# Patient Record
Sex: FEMALE | Race: WHITE
[De-identification: names, ages, dates, MRNs, and addresses within clinical notes are randomized per-mention and may not be internally consistent; named-entity substitution may affect disease eponyms.]

---

## 2019-06-01 ENCOUNTER — HOSPITAL ENCOUNTER (EMERGENCY)
Dept: HOSPITAL 42 - ED | Age: 24
Discharge: HOME | End: 2019-06-01
Payer: COMMERCIAL

## 2019-06-01 VITALS — BODY MASS INDEX: 24.1 KG/M2

## 2019-06-01 VITALS — TEMPERATURE: 99.2 F | OXYGEN SATURATION: 98 % | RESPIRATION RATE: 18 BRPM

## 2019-06-01 VITALS — SYSTOLIC BLOOD PRESSURE: 130 MMHG | DIASTOLIC BLOOD PRESSURE: 79 MMHG | HEART RATE: 90 BPM

## 2019-06-01 DIAGNOSIS — K04.7: Primary | ICD-10-CM

## 2019-06-01 PROCEDURE — 99283 EMERGENCY DEPT VISIT LOW MDM: CPT

## 2019-06-01 PROCEDURE — 96372 THER/PROPH/DIAG INJ SC/IM: CPT

## 2019-06-01 PROCEDURE — 81025 URINE PREGNANCY TEST: CPT

## 2019-06-01 NOTE — ED PDOC
Arrival/HPI





- General


Chief Complaint: Dental Pain


Time Seen by Provider: 06/01/19 11:25


Historian: Patient





- History of Present Illness


Narrative History of Present Illness (Text): 





06/01/19 12:06


23-year-old female presents today with a 3-day history of left upper dental 

pain.  Patient states she has been taking Motrin at home with improvement in the

pain.  Patient is complaining of a small lump located to the left upper front of

the roof of the mouth.  Patient denies fevers.  Patient denies trismus or 

drooling.  Patient states she has some pain with eating but improves after 

taking antibiotics.  No vomiting or diarrhea.  No other complaints





Past Medical History





- Provider Review


Nursing Documentation Reviewed: Yes





- Travel History


Have you recently traveled outside [place] w/in the past 3 mons?: No





- Infectious Disease


Hx of Infectious Diseases: None





- Tetanus Immunization


Tetanus Immunization: Unknown





- Cardiac


Hx Cardiac Disorders: No





- Pulmonary


Hx Respiratory Disorders: No





- Neurological


Hx Neurological Disorder: No





- HEENT


Hx HEENT Disorder: No





- Renal


Hx Renal Disorder: No





- Endocrine/Metabolic


Hx Endocrine Disorders: No





- Hematological/Oncological


Hx Blood Disorders: No





- Integumentary


Hx Dermatological Disorder: No





- Musculoskeletal/Rheumatological


Other/Comment: Scliosis





- Gastrointestinal


Hx Gastrointestinal Disorders: No





- Genitourinary/Gynecological


Hx Genitourinary Disorders: No





- Psychiatric


Hx Depression: No


Hx Emotional Abuse: No


Hx Physical Abuse: No


Hx Substance Use: No





- Surgical History


Hx Orthopedic Surgery: Yes (BACK)


Other/Comment: Metal plate in the back





- Anesthesia


Hx Anesthesia: Yes


Hx Anesthesia Reactions: No





- Suicidal Assessment


Feels Threatened In Home Enviroment: No





Family/Social History





- Physician Review


Nursing Documentation Reviewed: Yes


Family/Social History: Unknown Family HX


Smoking Status: Never Smoked


Hx Alcohol Use: No


Hx Substance Use: No


Hx Substance Use Treatment: No





Allergies/Home Meds


Allergies/Adverse Reactions: 


Allergies





No Known Allergies Allergy (Verified 06/01/19 11:24)


   











Review of Systems





- Review of Systems


Constitutional: absent: Fatigue, Fevers


ENT: Other (dental abscess).  absent: Sore Throat, Sinus Congestion


Respiratory: absent: SOB, Cough


Cardiovascular: absent: Chest Pain, Palpitations


Gastrointestinal: absent: Abdominal Pain, Nausea, Vomiting


Skin: Abscess (dental)


Neurological: absent: Headache, Dizziness


Psychiatric: absent: Anxiety, Depression





Physical Exam


Vital Signs Reviewed: Yes





Vital Signs











  Temp Pulse Resp BP Pulse Ox


 


 06/01/19 11:21  99.2 F  98 H  18  132/92 H  98











Temperature: Afebrile


Blood Pressure: Normal


Pulse: Regular


Respiratory Rate: Normal


Appearance: Positive for: Well-Appearing, Non-Toxic, Comfortable


Pain Distress: None


Mental Status: Positive for: Alert and Oriented X 3





- Systems Exam


Head: Present: Atraumatic


Conjunctiva: Present: Normal


Ears: Present: Normal


Mouth: Present: Moist Mucous Membranes, Normal Lips, Normal Tounge, Other (there

is a tender fluctuant lump noted to the roof of the mouth just adjacent to the 

#10/11th tooth. no erythema; ).  No: Drooling, Trismus


Pharnyx: No: ERYTHEMA, EXUDATE, TONSILS ENLARGED


Nose (External): Present: Atraumatic


Nose (Internal): Present: Normal Inspection


Neck: Present: Normal Range of Motion.  No: Lymphadenopathy, Trachea Midline


Respiratory/Chest: Present: Clear to Auscultation, Good Air Exchange.  No: 

Respiratory Distress, Accessory Muscle Use


Cardiovascular: Present: Regular Rate and Rhythm, Normal S1, S2.  No: Murmurs


Neurological: Present: GCS=15, Speech Normal


Skin: Present: Warm, Dry, Normal Color.  No: Rashes


Psychiatric: Present: Alert, Oriented x 3





Medical Decision Making


ED Course and Treatment: 





06/01/19 12:11


Patient is nontoxic well-appearing in no distress with stable vital signs





No trismus or drooling, moist mucous membranes





Toradol


clindamycin 300mg





Patient reassessment: Patient is feeling better after medications.





I advised follow-up with the dentist within the next 2 days. I advised immediate

return is symptoms worsen persist or if new concerning symptoms develop





Patient verbalizes understanding of discharge instructions and need for 

immediate followup.








Impression: Toothache, dental abscess


Motrin every 6 hours as needed for pain


Increase fluids


Clindamycin 1 tablet 3 times daily 7 days


Follow-up with the dentist within the next 2 days


Follow up with the primary care physician within the next 2 days.


Return immediately if symptoms worsen persist or if new symptoms develop: high 

fevers, worsening pain, swelling, erythema or if any other concerning symptoms 

develop.  





- Medication Orders


Current Medication Orders: 











Clindamycin HCl (Cleocin)  300 mg PO STAT STA; Protocol


   Stop: 06/01/19 12:06


Ketorolac Tromethamine (Toradol)  60 mg IM STAT STA


   Stop: 06/01/19 12:06











Disposition/Present on Arrival





- Present on Arrival


Any Indicators Present on Arrival: No


History of DVT/PE: No


History of Uncontrolled Diabetes: No


Urinary Catheter: No


History of Decub. Ulcer: No


History Surgical Site Infection Following: None





- Disposition


Have Diagnosis and Disposition been Completed?: Yes


Diagnosis: 


 Dental abscess





Disposition: HOME/ ROUTINE


Disposition Time: 12:15


Patient Plan: Discharge


Condition: GOOD


Discharge Instructions (ExitCare):  Tooth Abscess (DC), Dental Pain (DC)


Additional Instructions: 


Motrin every 6 hours as needed for pain


Increase fluids


Clindamycin 1 tablet 3 times daily 7 days


Follow-up with the dentist within the next 2 days


Follow up with the primary care physician within the next 2 days.


Return immediately if symptoms worsen persist or if new symptoms develop: high 

fevers, worsening pain, swelling, erythema or if any other concerning symptoms 

develop.  








[facility]


[address] [place] [address]


[phone]





[address] [place], [place]


[phone]











Prescriptions: 


Clindamycin [Cleocin] 300 mg PO TID #21 cap


Ibuprofen [Motrin] 600 mg PO Q6H PRN #20 tab


 PRN Reason: pain/fever reduction


Referrals: 


Wil Guillen DMD [Staff Provider] - Follow up with primary


Dakota Rothman DMD [Non-Staff] - Follow up with primary


Manuela Dawkins MD [Medical Doctor] - Follow up with primary


 Service [Outside] - Follow up with primary


Forms:  [employer] (English), WORK NOTE